# Patient Record
Sex: FEMALE | Race: WHITE | NOT HISPANIC OR LATINO | URBAN - METROPOLITAN AREA
[De-identification: names, ages, dates, MRNs, and addresses within clinical notes are randomized per-mention and may not be internally consistent; named-entity substitution may affect disease eponyms.]

---

## 2023-09-19 ENCOUNTER — ULTRASOUND (OUTPATIENT)
Dept: OBGYN CLINIC | Facility: CLINIC | Age: 23
End: 2023-09-19

## 2023-09-19 VITALS
DIASTOLIC BLOOD PRESSURE: 83 MMHG | RESPIRATION RATE: 18 BRPM | BODY MASS INDEX: 45.99 KG/M2 | HEIGHT: 67 IN | SYSTOLIC BLOOD PRESSURE: 148 MMHG | WEIGHT: 293 LBS | HEART RATE: 88 BPM

## 2023-09-19 DIAGNOSIS — Z32.01 PREGNANCY TEST POSITIVE: ICD-10-CM

## 2023-09-19 DIAGNOSIS — Z3A.08 8 WEEKS GESTATION OF PREGNANCY: ICD-10-CM

## 2023-09-19 DIAGNOSIS — N91.2 AMENORRHEA: Primary | ICD-10-CM

## 2023-09-19 LAB — SL AMB POCT URINE HCG: POSITIVE

## 2023-09-19 PROCEDURE — 76817 TRANSVAGINAL US OBSTETRIC: CPT | Performed by: OBSTETRICS & GYNECOLOGY

## 2023-09-19 PROCEDURE — 81025 URINE PREGNANCY TEST: CPT | Performed by: OBSTETRICS & GYNECOLOGY

## 2023-09-19 PROCEDURE — 99213 OFFICE O/P EST LOW 20 MIN: CPT | Performed by: OBSTETRICS & GYNECOLOGY

## 2023-09-19 RX ORDER — CETIRIZINE HYDROCHLORIDE 10 MG/1
10 TABLET ORAL DAILY
COMMUNITY

## 2023-09-19 RX ORDER — OMEPRAZOLE 20 MG/1
20 CAPSULE, DELAYED RELEASE ORAL DAILY
COMMUNITY

## 2023-09-19 NOTE — ASSESSMENT & PLAN NOTE
Dating today by LMP- 8w1d   Patient is taking prenatal vitamins   Today we discussed expectations of pregnancy and visits moving forward. Today she does have an elevated blood pressure. She denies any history of hypertension. Discussed with her concern for worsening HTN in pregnancy. We talked about ASA for prevention. We additionally discussed her elevated BMI and concern for maternal and fetal health. We will continue to address.   '  She and her partner desire first trimester screening. Referral to [facility] placed.

## 2023-09-19 NOTE — PROGRESS NOTES
OB/GYN- Amenorrhea Visit   Ann Stanley  2023  4:26 PM    Assessment/Plan:   Samantha Bruno is a 24 y.o.  with an LMP of Patient's last menstrual period was 2023 (exact date). (8w1d by LMP), pregnancy confirmed. Will date via LMP with CECELIA 2024. Problem List Items Addressed This Visit        Other    Amenorrhea - Primary     Dating today by LMP- 8w1d   Patient is taking prenatal vitamins   Today we discussed expectations of pregnancy and visits moving forward. Today she does have an elevated blood pressure. She denies any history of hypertension. Discussed with her concern for worsening HTN in pregnancy. We talked about ASA for prevention. We additionally discussed her elevated BMI and concern for maternal and fetal health. We will continue to address.   '  She and her partner desire first trimester screening. Referral to M placed. Relevant Orders    HIV 1/2 AG/AB w Reflex [facility] for 2 yr old and above    Hepatitis C antibody    UA (URINE) with reflex to [facility]ope    Urine culture    Hepatitis B surface antigen    CBC and differential    Rubella antibody, IgG    Type and screen    [facility]-Syphilis Screening (Total Syphilis IGG/IGM)    Hepatitis B surface antibody    Anemia Panel w/Reflex, OB    Comprehensive metabolic panel    Glucose, 1H PG    Protein / creatinine ratio, urine    Ambulatory Referral to Maternal Fetal Medicine    [facility] US OB < 14 weeks single or first gestation level 1 (Completed)    8 weeks gestation of pregnancy   Other Visit Diagnoses     Pregnancy test positive        Relevant Orders    POCT urine HCG (Completed)          ____________________________________________________________      Subjective   Samantha Bruno is a 24 y.o.  with an LMP of Patient's last menstrual period was 2023 (exact date). (8w1d by LMP) who presents for amenorrhea. She notes she took a home pregnancy test on 8/15 and it was positive.  She is currently otherwise without complaint. Patient notes that this pregnancy was unplanned but welcomed. She was not using contraception at the time. She reports she is certain of her LMP and that she has regular menses. She has has no vaginal bleeding since her LMP. This is her first pregnancy. Review of Systems  Review of Systems   Constitutional: Negative for chills and fever. Respiratory: Negative for shortness of breath. Cardiovascular: Negative for chest pain. Gastrointestinal: Negative for nausea and vomiting. Genitourinary: Negative for vaginal bleeding, vaginal discharge and vaginal pain. Musculoskeletal: Negative. Neurological: Negative. Objective  /83 (BP Location: Left arm, Patient Position: Sitting, Cuff Size: Large)   Pulse 88   Resp 18   Ht 5' 7" (1.702 m)   Wt (!) 154 kg (340 lb 3.2 oz)   LMP 07/24/2023 (Exact Date)   BMI 53.28 kg/m²       Physical Exam:  Physical Exam  Cardiovascular:      Rate and Rhythm: Normal rate and regular rhythm. Pulmonary:      Effort: Pulmonary effort is normal.   Abdominal:      Palpations: Abdomen is soft. Tenderness: There is no abdominal tenderness. There is no guarding or rebound. Genitourinary:     General: Normal vulva. Musculoskeletal:      Right lower leg: No edema. Left lower leg: No edema. Neurological:      Mental Status: She is alert.          Transvaginal Pelvic Ultrasound  [facility] IUP  CRL 1.35cm, consistent with EGA 7w4d  +yolk sac  +cardiac activity  FHR 155bpm  No adnexal masses appreciated              Gagandeep Brewer MD  OBGYN PGY-3  4:26 PM  9/19/2023